# Patient Record
Sex: FEMALE | Race: WHITE | NOT HISPANIC OR LATINO
[De-identification: names, ages, dates, MRNs, and addresses within clinical notes are randomized per-mention and may not be internally consistent; named-entity substitution may affect disease eponyms.]

---

## 2021-04-27 ENCOUNTER — TRANSCRIPTION ENCOUNTER (OUTPATIENT)
Age: 36
End: 2021-04-27

## 2021-12-14 ENCOUNTER — TRANSCRIPTION ENCOUNTER (OUTPATIENT)
Age: 36
End: 2021-12-14

## 2023-02-06 ENCOUNTER — NON-APPOINTMENT (OUTPATIENT)
Age: 38
End: 2023-02-06

## 2023-02-13 ENCOUNTER — NON-APPOINTMENT (OUTPATIENT)
Age: 38
End: 2023-02-13

## 2023-07-15 ENCOUNTER — NON-APPOINTMENT (OUTPATIENT)
Age: 38
End: 2023-07-15

## 2024-03-07 ENCOUNTER — INPATIENT (INPATIENT)
Facility: HOSPITAL | Age: 39
LOS: 2 days | Discharge: HOME | End: 2024-03-10
Attending: OBSTETRICS & GYNECOLOGY | Admitting: OBSTETRICS & GYNECOLOGY
Payer: COMMERCIAL

## 2024-03-07 VITALS — DIASTOLIC BLOOD PRESSURE: 107 MMHG | OXYGEN SATURATION: 99 % | SYSTOLIC BLOOD PRESSURE: 181 MMHG | HEART RATE: 106 BPM

## 2024-03-07 DIAGNOSIS — Z98.890 OTHER SPECIFIED POSTPROCEDURAL STATES: Chronic | ICD-10-CM

## 2024-03-07 DIAGNOSIS — Z3A.39 39 WEEKS GESTATION OF PREGNANCY: ICD-10-CM

## 2024-03-07 DIAGNOSIS — O26.899 OTHER SPECIFIED PREGNANCY RELATED CONDITIONS, UNSPECIFIED TRIMESTER: ICD-10-CM

## 2024-03-07 DIAGNOSIS — Z34.80 ENCOUNTER FOR SUPERVISION OF OTHER NORMAL PREGNANCY, UNSPECIFIED TRIMESTER: ICD-10-CM

## 2024-03-07 LAB
ALBUMIN SERPL ELPH-MCNC: 3.6 G/DL — SIGNIFICANT CHANGE UP (ref 3.3–5)
ALP SERPL-CCNC: 123 U/L — HIGH (ref 40–120)
ALT FLD-CCNC: 22 U/L — SIGNIFICANT CHANGE UP (ref 10–45)
ANION GAP SERPL CALC-SCNC: 17 MMOL/L — SIGNIFICANT CHANGE UP (ref 5–17)
APPEARANCE UR: CLEAR — SIGNIFICANT CHANGE UP
APTT BLD: 28.8 SEC — SIGNIFICANT CHANGE UP (ref 24.5–35.6)
AST SERPL-CCNC: 26 U/L — SIGNIFICANT CHANGE UP (ref 10–40)
BACTERIA # UR AUTO: NEGATIVE /HPF — SIGNIFICANT CHANGE UP
BASOPHILS # BLD AUTO: 0.04 K/UL — SIGNIFICANT CHANGE UP (ref 0–0.2)
BASOPHILS NFR BLD AUTO: 0.3 % — SIGNIFICANT CHANGE UP (ref 0–2)
BILIRUB SERPL-MCNC: 0.3 MG/DL — SIGNIFICANT CHANGE UP (ref 0.2–1.2)
BILIRUB UR-MCNC: NEGATIVE — SIGNIFICANT CHANGE UP
BLD GP AB SCN SERPL QL: POSITIVE — SIGNIFICANT CHANGE UP
BUN SERPL-MCNC: 13 MG/DL — SIGNIFICANT CHANGE UP (ref 7–23)
CALCIUM SERPL-MCNC: 9 MG/DL — SIGNIFICANT CHANGE UP (ref 8.4–10.5)
CAST: 0 /LPF — SIGNIFICANT CHANGE UP (ref 0–4)
CHLORIDE SERPL-SCNC: 103 MMOL/L — SIGNIFICANT CHANGE UP (ref 96–108)
CO2 SERPL-SCNC: 16 MMOL/L — LOW (ref 22–31)
COLOR SPEC: YELLOW — SIGNIFICANT CHANGE UP
CREAT ?TM UR-MCNC: 60 MG/DL — SIGNIFICANT CHANGE UP
CREAT SERPL-MCNC: 0.6 MG/DL — SIGNIFICANT CHANGE UP (ref 0.5–1.3)
DIFF PNL FLD: ABNORMAL
EGFR: 118 ML/MIN/1.73M2 — SIGNIFICANT CHANGE UP
EOSINOPHIL # BLD AUTO: 0.02 K/UL — SIGNIFICANT CHANGE UP (ref 0–0.5)
EOSINOPHIL NFR BLD AUTO: 0.2 % — SIGNIFICANT CHANGE UP (ref 0–6)
FIBRINOGEN PPP-MCNC: 442 MG/DL — SIGNIFICANT CHANGE UP (ref 200–445)
GLUCOSE SERPL-MCNC: 95 MG/DL — SIGNIFICANT CHANGE UP (ref 70–99)
GLUCOSE UR QL: NEGATIVE MG/DL — SIGNIFICANT CHANGE UP
HCT VFR BLD CALC: 37.1 % — SIGNIFICANT CHANGE UP (ref 34.5–45)
HGB BLD-MCNC: 13.3 G/DL — SIGNIFICANT CHANGE UP (ref 11.5–15.5)
IMM GRANULOCYTES NFR BLD AUTO: 0.3 % — SIGNIFICANT CHANGE UP (ref 0–0.9)
INR BLD: 0.9 RATIO — SIGNIFICANT CHANGE UP (ref 0.85–1.18)
KETONES UR-MCNC: ABNORMAL MG/DL
KLEIHAUER-BETKE CALCULATION: 0 % — SIGNIFICANT CHANGE UP (ref 0–0.2)
LDH SERPL L TO P-CCNC: 200 U/L — SIGNIFICANT CHANGE UP (ref 50–242)
LEUKOCYTE ESTERASE UR-ACNC: NEGATIVE — SIGNIFICANT CHANGE UP
LYMPHOCYTES # BLD AUTO: 1.35 K/UL — SIGNIFICANT CHANGE UP (ref 1–3.3)
LYMPHOCYTES # BLD AUTO: 10.9 % — LOW (ref 13–44)
MAGNESIUM SERPL-MCNC: 4.9 MG/DL — HIGH (ref 1.6–2.6)
MAGNESIUM SERPL-MCNC: 5.5 MG/DL — HIGH (ref 1.6–2.6)
MAGNESIUM SERPL-MCNC: <5.7 MG/DL — HIGH (ref 1.6–2.6)
MCHC RBC-ENTMCNC: 32.6 PG — SIGNIFICANT CHANGE UP (ref 27–34)
MCHC RBC-ENTMCNC: 35.8 GM/DL — SIGNIFICANT CHANGE UP (ref 32–36)
MCV RBC AUTO: 90.9 FL — SIGNIFICANT CHANGE UP (ref 80–100)
MONOCYTES # BLD AUTO: 0.61 K/UL — SIGNIFICANT CHANGE UP (ref 0–0.9)
MONOCYTES NFR BLD AUTO: 4.9 % — SIGNIFICANT CHANGE UP (ref 2–14)
NEUTROPHILS # BLD AUTO: 10.32 K/UL — HIGH (ref 1.8–7.4)
NEUTROPHILS NFR BLD AUTO: 83.4 % — HIGH (ref 43–77)
NITRITE UR-MCNC: NEGATIVE — SIGNIFICANT CHANGE UP
NRBC # BLD: 0 /100 WBCS — SIGNIFICANT CHANGE UP (ref 0–0)
PH UR: 7.5 — SIGNIFICANT CHANGE UP (ref 5–8)
PLATELET # BLD AUTO: 121 K/UL — LOW (ref 150–400)
POTASSIUM SERPL-MCNC: 3.7 MMOL/L — SIGNIFICANT CHANGE UP (ref 3.5–5.3)
POTASSIUM SERPL-SCNC: 3.7 MMOL/L — SIGNIFICANT CHANGE UP (ref 3.5–5.3)
PROT ?TM UR-MCNC: 79 MG/DL — HIGH (ref 0–12)
PROT SERPL-MCNC: 7.4 G/DL — SIGNIFICANT CHANGE UP (ref 6–8.3)
PROT UR-MCNC: 100 MG/DL
PROT/CREAT UR-RTO: 1.3 RATIO — HIGH (ref 0–0.2)
PROTHROM AB SERPL-ACNC: 9.5 SEC — SIGNIFICANT CHANGE UP (ref 9.5–13)
RBC # BLD: 4.08 M/UL — SIGNIFICANT CHANGE UP (ref 3.8–5.2)
RBC # FLD: 13.8 % — SIGNIFICANT CHANGE UP (ref 10.3–14.5)
RBC CASTS # UR COMP ASSIST: 4 /HPF — SIGNIFICANT CHANGE UP (ref 0–4)
RH IG SCN BLD-IMP: NEGATIVE — SIGNIFICANT CHANGE UP
SODIUM SERPL-SCNC: 136 MMOL/L — SIGNIFICANT CHANGE UP (ref 135–145)
SP GR SPEC: 1.02 — SIGNIFICANT CHANGE UP (ref 1–1.03)
SQUAMOUS # UR AUTO: 0 /HPF — SIGNIFICANT CHANGE UP (ref 0–5)
T PALLIDUM AB TITR SER: NEGATIVE — SIGNIFICANT CHANGE UP
URATE SERPL-MCNC: 4.6 MG/DL — SIGNIFICANT CHANGE UP (ref 2.5–7)
UROBILINOGEN FLD QL: 1 MG/DL — SIGNIFICANT CHANGE UP (ref 0.2–1)
WBC # BLD: 12.38 K/UL — HIGH (ref 3.8–10.5)
WBC # FLD AUTO: 12.38 K/UL — HIGH (ref 3.8–10.5)
WBC UR QL: 4 /HPF — SIGNIFICANT CHANGE UP (ref 0–5)

## 2024-03-07 PROCEDURE — 86077 PHYS BLOOD BANK SERV XMATCH: CPT

## 2024-03-07 RX ORDER — BENZOCAINE 10 %
1 GEL (GRAM) MUCOUS MEMBRANE EVERY 6 HOURS
Refills: 0 | Status: DISCONTINUED | OUTPATIENT
Start: 2024-03-07 | End: 2024-03-10

## 2024-03-07 RX ORDER — AER TRAVELER 0.5 G/1
1 SOLUTION RECTAL; TOPICAL EVERY 4 HOURS
Refills: 0 | Status: DISCONTINUED | OUTPATIENT
Start: 2024-03-07 | End: 2024-03-10

## 2024-03-07 RX ORDER — DIBUCAINE 1 %
1 OINTMENT (GRAM) RECTAL EVERY 6 HOURS
Refills: 0 | Status: DISCONTINUED | OUTPATIENT
Start: 2024-03-07 | End: 2024-03-10

## 2024-03-07 RX ORDER — HYDROCORTISONE 1 %
1 OINTMENT (GRAM) TOPICAL EVERY 6 HOURS
Refills: 0 | Status: DISCONTINUED | OUTPATIENT
Start: 2024-03-07 | End: 2024-03-10

## 2024-03-07 RX ORDER — TETANUS TOXOID, REDUCED DIPHTHERIA TOXOID AND ACELLULAR PERTUSSIS VACCINE, ADSORBED 5; 2.5; 8; 8; 2.5 [IU]/.5ML; [IU]/.5ML; UG/.5ML; UG/.5ML; UG/.5ML
0.5 SUSPENSION INTRAMUSCULAR ONCE
Refills: 0 | Status: DISCONTINUED | OUTPATIENT
Start: 2024-03-07 | End: 2024-03-10

## 2024-03-07 RX ORDER — PRAMOXINE HYDROCHLORIDE 150 MG/15G
1 AEROSOL, FOAM RECTAL EVERY 4 HOURS
Refills: 0 | Status: DISCONTINUED | OUTPATIENT
Start: 2024-03-07 | End: 2024-03-10

## 2024-03-07 RX ORDER — ACETAMINOPHEN 500 MG
975 TABLET ORAL
Refills: 0 | Status: DISCONTINUED | OUTPATIENT
Start: 2024-03-07 | End: 2024-03-10

## 2024-03-07 RX ORDER — INFLUENZA VIRUS VACCINE 15; 15; 15; 15 UG/.5ML; UG/.5ML; UG/.5ML; UG/.5ML
0.5 SUSPENSION INTRAMUSCULAR ONCE
Refills: 0 | Status: COMPLETED | OUTPATIENT
Start: 2024-03-07 | End: 2024-03-07

## 2024-03-07 RX ORDER — SODIUM CHLORIDE 9 MG/ML
3 INJECTION INTRAMUSCULAR; INTRAVENOUS; SUBCUTANEOUS EVERY 8 HOURS
Refills: 0 | Status: DISCONTINUED | OUTPATIENT
Start: 2024-03-07 | End: 2024-03-09

## 2024-03-07 RX ORDER — MAGNESIUM HYDROXIDE 400 MG/1
30 TABLET, CHEWABLE ORAL
Refills: 0 | Status: DISCONTINUED | OUTPATIENT
Start: 2024-03-07 | End: 2024-03-10

## 2024-03-07 RX ORDER — MAGNESIUM SULFATE 500 MG/ML
4 VIAL (ML) INJECTION ONCE
Refills: 0 | Status: COMPLETED | OUTPATIENT
Start: 2024-03-07 | End: 2024-03-07

## 2024-03-07 RX ORDER — LABETALOL HCL 100 MG
20 TABLET ORAL ONCE
Refills: 0 | Status: COMPLETED | OUTPATIENT
Start: 2024-03-07 | End: 2024-03-07

## 2024-03-07 RX ORDER — LABETALOL HCL 100 MG
40 TABLET ORAL ONCE
Refills: 0 | Status: COMPLETED | OUTPATIENT
Start: 2024-03-07 | End: 2024-03-07

## 2024-03-07 RX ORDER — CITRIC ACID/SODIUM CITRATE 300-500 MG
15 SOLUTION, ORAL ORAL EVERY 6 HOURS
Refills: 0 | Status: DISCONTINUED | OUTPATIENT
Start: 2024-03-07 | End: 2024-03-07

## 2024-03-07 RX ORDER — NIFEDIPINE 30 MG
30 TABLET, EXTENDED RELEASE 24 HR ORAL DAILY
Refills: 0 | Status: DISCONTINUED | OUTPATIENT
Start: 2024-03-07 | End: 2024-03-07

## 2024-03-07 RX ORDER — MAGNESIUM SULFATE 500 MG/ML
2 VIAL (ML) INJECTION
Qty: 40 | Refills: 0 | Status: DISCONTINUED | OUTPATIENT
Start: 2024-03-07 | End: 2024-03-07

## 2024-03-07 RX ORDER — LANOLIN
1 OINTMENT (GRAM) TOPICAL EVERY 6 HOURS
Refills: 0 | Status: DISCONTINUED | OUTPATIENT
Start: 2024-03-07 | End: 2024-03-10

## 2024-03-07 RX ORDER — SIMETHICONE 80 MG/1
80 TABLET, CHEWABLE ORAL EVERY 4 HOURS
Refills: 0 | Status: DISCONTINUED | OUTPATIENT
Start: 2024-03-07 | End: 2024-03-10

## 2024-03-07 RX ORDER — SODIUM CHLORIDE 9 MG/ML
1000 INJECTION, SOLUTION INTRAVENOUS
Refills: 0 | Status: DISCONTINUED | OUTPATIENT
Start: 2024-03-07 | End: 2024-03-07

## 2024-03-07 RX ORDER — OXYCODONE HYDROCHLORIDE 5 MG/1
5 TABLET ORAL
Refills: 0 | Status: DISCONTINUED | OUTPATIENT
Start: 2024-03-07 | End: 2024-03-10

## 2024-03-07 RX ORDER — NIFEDIPINE 30 MG
60 TABLET, EXTENDED RELEASE 24 HR ORAL DAILY
Refills: 0 | Status: DISCONTINUED | OUTPATIENT
Start: 2024-03-08 | End: 2024-03-10

## 2024-03-07 RX ORDER — OXYTOCIN 10 UNIT/ML
333.33 VIAL (ML) INJECTION
Qty: 20 | Refills: 0 | Status: DISCONTINUED | OUTPATIENT
Start: 2024-03-07 | End: 2024-03-10

## 2024-03-07 RX ORDER — OXYCODONE HYDROCHLORIDE 5 MG/1
5 TABLET ORAL ONCE
Refills: 0 | Status: DISCONTINUED | OUTPATIENT
Start: 2024-03-07 | End: 2024-03-10

## 2024-03-07 RX ORDER — OXYTOCIN 10 UNIT/ML
41.67 VIAL (ML) INJECTION
Qty: 20 | Refills: 0 | Status: DISCONTINUED | OUTPATIENT
Start: 2024-03-07 | End: 2024-03-10

## 2024-03-07 RX ORDER — NIFEDIPINE 30 MG
30 TABLET, EXTENDED RELEASE 24 HR ORAL ONCE
Refills: 0 | Status: COMPLETED | OUTPATIENT
Start: 2024-03-07 | End: 2024-03-07

## 2024-03-07 RX ORDER — DIPHENHYDRAMINE HCL 50 MG
25 CAPSULE ORAL EVERY 6 HOURS
Refills: 0 | Status: DISCONTINUED | OUTPATIENT
Start: 2024-03-07 | End: 2024-03-10

## 2024-03-07 RX ORDER — KETOROLAC TROMETHAMINE 30 MG/ML
30 SYRINGE (ML) INJECTION ONCE
Refills: 0 | Status: DISCONTINUED | OUTPATIENT
Start: 2024-03-07 | End: 2024-03-07

## 2024-03-07 RX ORDER — MAGNESIUM SULFATE 500 MG/ML
2 VIAL (ML) INJECTION
Qty: 40 | Refills: 0 | Status: DISCONTINUED | OUTPATIENT
Start: 2024-03-07 | End: 2024-03-10

## 2024-03-07 RX ORDER — IBUPROFEN 200 MG
600 TABLET ORAL EVERY 6 HOURS
Refills: 0 | Status: DISCONTINUED | OUTPATIENT
Start: 2024-03-07 | End: 2024-03-10

## 2024-03-07 RX ORDER — IBUPROFEN 200 MG
600 TABLET ORAL EVERY 6 HOURS
Refills: 0 | Status: COMPLETED | OUTPATIENT
Start: 2024-03-07 | End: 2025-02-03

## 2024-03-07 RX ORDER — CHLORHEXIDINE GLUCONATE 213 G/1000ML
1 SOLUTION TOPICAL DAILY
Refills: 0 | Status: DISCONTINUED | OUTPATIENT
Start: 2024-03-07 | End: 2024-03-07

## 2024-03-07 RX ADMIN — SODIUM CHLORIDE 3 MILLILITER(S): 9 INJECTION INTRAMUSCULAR; INTRAVENOUS; SUBCUTANEOUS at 12:39

## 2024-03-07 RX ADMIN — Medication 975 MILLIGRAM(S): at 23:34

## 2024-03-07 RX ADMIN — Medication 600 MILLIGRAM(S): at 15:05

## 2024-03-07 RX ADMIN — Medication 30 MILLIGRAM(S): at 08:58

## 2024-03-07 RX ADMIN — Medication 600 MILLIGRAM(S): at 14:55

## 2024-03-07 RX ADMIN — Medication 975 MILLIGRAM(S): at 12:08

## 2024-03-07 RX ADMIN — Medication 975 MILLIGRAM(S): at 17:55

## 2024-03-07 RX ADMIN — Medication 1 TABLET(S): at 12:08

## 2024-03-07 RX ADMIN — Medication 20 MILLIGRAM(S): at 12:36

## 2024-03-07 RX ADMIN — Medication 600 MILLIGRAM(S): at 21:05

## 2024-03-07 RX ADMIN — Medication 300 GRAM(S): at 04:40

## 2024-03-07 RX ADMIN — Medication 975 MILLIGRAM(S): at 18:40

## 2024-03-07 RX ADMIN — Medication 40 MILLIGRAM(S): at 12:55

## 2024-03-07 RX ADMIN — Medication 20 MILLIGRAM(S): at 04:34

## 2024-03-07 RX ADMIN — Medication 30 MILLIGRAM(S): at 10:56

## 2024-03-07 RX ADMIN — Medication 30 MILLIGRAM(S): at 13:06

## 2024-03-07 RX ADMIN — Medication 975 MILLIGRAM(S): at 13:00

## 2024-03-07 NOTE — CHART NOTE - NSCHARTNOTEFT_GEN_A_CORE
PA Note  Pt seen for sustained severe range blood pressures. Pt received Labetalol 20mg/40mg IVP. Pt is asymptomatic. Denies headaches, blurry vision, epigastric pain.  T(C): 36.6 (24 @ 10:20), Max: 37.4 (24 @ 05:48)  HR: 73 (24 @ 12:46) (73 - 125)  BP: 165/103 (24 @ 12:46) (103/67 - 188/104)  RR: 18 (24 @ 12:27) (16 - 20)  SpO2: 97% (24 @ 12:12) (85% - 100%)  PPD#0 sp  cb sPEC  cw Mag for seizure prophylaxis  sp Labetalol 20/40 mg IVP  Increase maintenance antihypertensive to Nifedipine 30XL  DW Dr Yadiel Duffy PA-C

## 2024-03-07 NOTE — PROGRESS NOTE ADULT - ASSESSMENT
A: 38y PPD#0 s/p  doing well, now with preeclampsia    Plan:  MgS04 for seizure prophylaxis   procardia increased to 60 mg xl  labetalol ivp x 2  patient overall feeling well  to observe

## 2024-03-07 NOTE — OB PROVIDER LABOR PROGRESS NOTE - NS_SUBJECTIVE/OBJECTIVE_OBGYN_ALL_OB_FT
R2 Labor & Delivery Progress Note     Pt seen & examined at bedside for vaginal exam.     T(C): 36.7 (03-07-24 @ 03:22), Max: 36.7 (03-07-24 @ 02:46)  HR: 107 (03-07-24 @ 04:09) (87 - 125)  BP: 174/101 (03-07-24 @ 04:02) (146/98 - 188/104)  RR: 20 (03-07-24 @ 03:22) (20 - 20)  SpO2: 97% (03-07-24 @ 04:09) (85% - 100%)
P0 39w3d admitted in labor.   has epidural.   rectal pressure.   cat 2 fht, occ variables. overall reassuring.   severe range BP x 2. labetalol 20 IVP improved to 140/90. starting magnesium.

## 2024-03-07 NOTE — OB PROVIDER H&P - NSLOWPPHRISK_OBGYN_A_OB
No previous uterine incision/[place] Pregnancy/Less than or equal to 4 previous vaginal births/No known bleeding disorder

## 2024-03-07 NOTE — OB PROVIDER DELIVERY SUMMARY - NSSELHIDDEN_OBGYN_ALL_OB_FT
[NS_DeliveryAttending1_OBGYN_ALL_OB_FT:[id number]],[NS_DeliveryAssist1_OBGYN_ALL_OB_FT:[id number]]

## 2024-03-07 NOTE — CHART NOTE - NSCHARTNOTEFT_GEN_A_CORE
R4 Chart Note       The above's patients blood pressures from admission to current were reviewed. Patient with multiple elevated blood pressures most in the severe range since admission , however patient with discomfort of labor at this time. Patient then received epidural with with initial minimal relief in pain. Patient re-examined at noted to be fully dialated at this time . Patient pain much improved but with more rectal pressure at this time . Patient to receive top off.     If patient continues to have elevated blood pressures despite adequate pain control , patient will meet criteria for severe preeclampsia and be started on Magnesium , possible need IV blood pressure medication and oral pain medication         d/w    Bernadette Mcbride, PGY4 R4 Chart Note       The above's patients blood pressures from admission to current were reviewed. Patient with multiple elevated blood pressures most in the severe range since admission , however patient with discomfort of labor at this time. Patient then received epidural with with initial minimal relief in pain. Patient re-examined at noted to be fully dialated at this time . Patient pain much improved but with more rectal pressure at this time . Patient to receive top off.     If patient continues to have elevated blood pressures despite adequate pain control , patient will meet criteria for severe preeclampsia and be started on Magnesium , possible need IV blood pressure medication and oral pain medication         d/w  and Dr. Heidi Mcbride, PGY4       Addendum       Patient continued to have elevated blood pressures, meeting criteria for severe preeclampsia. Patient to receive Lab 20 IVP and Magnesium        d/w  and Dr. Heidi Mcbride, PGY4

## 2024-03-07 NOTE — OB RN DELIVERY SUMMARY - NSSELHIDDEN_OBGYN_ALL_OB_FT
[NS_DeliveryAttending1_OBGYN_ALL_OB_FT:[id number]],[NS_DeliveryAssist1_OBGYN_ALL_OB_FT:[id number]],[NS_DeliveryRN_OBGYN_ALL_OB_FT:[id number]=]

## 2024-03-07 NOTE — OB PROVIDER H&P - NS_OBGYNHISTORY_OBGYN_ALL_OB_FT
– PNC: Denies prenatal issues. GBS negative. EFW 3700g extrapolated from sono in office 2 weeks ago.  – OBHx: Primigravida. Denies ectopic pregnancies, terminations, or miscarriages.  – GynHx: Ovarian cystectomy 2012. Fibroids unsure which position, ~3cm per patient. Denies abnormal pap smears, STIs

## 2024-03-07 NOTE — OB PROVIDER H&P - ATTENDING COMMENTS
primip 39w3d in active labor.   admitted for labor. elevated BP labile.   routine orders.   epidural and expt mgmt.   efw 7-8lbs.   disc vaginal delivery, possible operative and possible  if indicated.   accepts blood products.   all questions and concerns addressed.   Nolan SU

## 2024-03-07 NOTE — OB PROVIDER H&P - PROBLEM SELECTOR PLAN 1
Plan  1. Admit to L&D. Routine Labs. IVF.  2. Expectant management  3. Fetus: cat 1 tracing. VTX. 3700g extrapolated from sono in office 2 weeks ago. Continuous EFM. Sono. No concerns.  4. Prenatal issues: none  5. GBS negative  6. Pain: anesthesia consult for an epidural  7. Severe range BP in presence of intense pain with contractions: HELLP labs, monitor serial BPs, consider treatment    Discussed with Dr. Heidi Winters PA-C

## 2024-03-07 NOTE — OB PROVIDER H&P - NSHPPHYSICALEXAM_GEN_ALL_CORE
Objective  – VS  T(C): 36.7 (03-07-24 @ 02:46)  HR: 101 (03-07-24 @ 02:47)  BP: 168/89 (03-07-24 @ 02:46)  RR: 20 (03-07-24 @ 02:46)  SpO2: 99% (03-07-24 @ 02:47)  – PE:   General: visible discomfort along with contractions with regular deep breathing  CV: RRR  Pulm: breathing comfortably on RA  Abd: gravid, nontender  Extr: moving all extremities with ease  – VE: 5/50/-3  – FHT: 135/moderate variability/+accels/-decels  – [place]: q2-4 min  – EFW: 3700g extrapolated from sono in office 2 weeks ago  – Sono: vertex

## 2024-03-07 NOTE — OB PROVIDER LABOR PROGRESS NOTE - ASSESSMENT
primip 39w3d in active labor.   setting up for delivery.   cat 2 fht   hopeful for vaginal delivery.   severe pec now with mag.   disc vaginal delivery, possible operative and possible  if indicated.   accepts blood products.   all questions and concerns addressed.   Nolan SU
Plan: 38y y/o  @ 39w3d. Feeling rectal pressure and now fully dilated  - anticipate   - Continuous EFM, [place]  - Con't IVF  - patient also with severe range BP with pain and contractions; CTM BPs, discussion of possible diagnosis of PEC with SF with patient and   - HELLP labs pending    Porsha Beasley MD  PGY-2

## 2024-03-07 NOTE — OB PROVIDER H&P - HISTORY OF PRESENT ILLNESS
PA Admission H&P    Subjective  HPI: 34F  39.4wks gestational age presents for ROL. Pt notes onset of contractions beginning 2.5hrs ago, occurring every 3 min, intense pain. Pt desires an epidural at this time. At time in triage, pt notes feeling gush of fluid. +FM. -VB. Pt denies headaches, visual disturbances, RUQ pain, or any other concerns. Pt notes she was 2cm dilated in office today.    – PNC: Denies prenatal issues. GBS negative. EFW 3700g extrapolated from sono in office 2 weeks ago.  – OBHx: Primigravida. Denies ectopic pregnancies, terminations, or miscarriages.  – GynHx: Ovarian cystectomy . Fibroids unsure which position, ~3cm per patient. Denies abnormal pap smears, STIs  – PMH: Anxiety (previously on Lexapro 10mg QD before pregnancy, now discontinued)  – PSH: Rhinoplasty. Ovarian cystectomy .   – Psych: Anxiety (previously on Lexapro 10mg QD before pregnancy, now discontinued). Denies depression.  – Social: Denies T/E/D  – Meds: PNV   – Allergies: NKDA  – Will accept blood transfusions? Yes

## 2024-03-07 NOTE — OB RN DELIVERY SUMMARY - NS_SEPSISRSKCALC_OBGYN_ALL_OB_FT
EOS calculated successfully. EOS Risk Factor: 0.5/1000 live births ([facility] national incidence); GA=39w3d; Temp=99.3; ROM=2.867; GBS='Negative'; Antibiotics='No antibiotics or any antibiotics < 2 hrs prior to birth'

## 2024-03-07 NOTE — OB PROVIDER DELIVERY SUMMARY - NSPROVIDERDELIVERYNOTE_OBGYN_ALL_OB_FT
Spontaneous vaginal delivery of liveborn female infant from ALEXEY position. Head, shoulders, and body delivered easily. Infant was suctioned. No mec. 1 minute delayed cord clamping was performed. Cord clamped and cut and infant passed to mother. Placenta delivered intact with a 3 vessel cord. Fundal massage was given and uterine fundus was found to be firm. Vaginal exam revealed an intact cervix, vaginal walls, and sulci. Patient had a 1st degree laceration in the perineum that was repaired with 2.0 vicryl-rapide suture. Excellent hemostasis was noted. Patient was stable. Count was correct x2.    Apgars 9/9  EBL: 400

## 2024-03-08 LAB
ALBUMIN SERPL ELPH-MCNC: 3.2 G/DL — LOW (ref 3.3–5)
ALP SERPL-CCNC: 98 U/L — SIGNIFICANT CHANGE UP (ref 40–120)
ALT FLD-CCNC: 24 U/L — SIGNIFICANT CHANGE UP (ref 10–45)
ANION GAP SERPL CALC-SCNC: 11 MMOL/L — SIGNIFICANT CHANGE UP (ref 5–17)
APTT BLD: 27.3 SEC — SIGNIFICANT CHANGE UP (ref 24.5–35.6)
AST SERPL-CCNC: 32 U/L — SIGNIFICANT CHANGE UP (ref 10–40)
BASOPHILS # BLD AUTO: 0.03 K/UL — SIGNIFICANT CHANGE UP (ref 0–0.2)
BASOPHILS NFR BLD AUTO: 0.3 % — SIGNIFICANT CHANGE UP (ref 0–2)
BILIRUB SERPL-MCNC: 0.2 MG/DL — SIGNIFICANT CHANGE UP (ref 0.2–1.2)
BUN SERPL-MCNC: 6 MG/DL — LOW (ref 7–23)
CALCIUM SERPL-MCNC: 6.6 MG/DL — LOW (ref 8.4–10.5)
CHLORIDE SERPL-SCNC: 104 MMOL/L — SIGNIFICANT CHANGE UP (ref 96–108)
CO2 SERPL-SCNC: 22 MMOL/L — SIGNIFICANT CHANGE UP (ref 22–31)
CREAT SERPL-MCNC: 0.53 MG/DL — SIGNIFICANT CHANGE UP (ref 0.5–1.3)
EGFR: 122 ML/MIN/1.73M2 — SIGNIFICANT CHANGE UP
EOSINOPHIL # BLD AUTO: 0.03 K/UL — SIGNIFICANT CHANGE UP (ref 0–0.5)
EOSINOPHIL NFR BLD AUTO: 0.3 % — SIGNIFICANT CHANGE UP (ref 0–6)
FIBRINOGEN PPP-MCNC: 394 MG/DL — SIGNIFICANT CHANGE UP (ref 200–445)
GLUCOSE SERPL-MCNC: 80 MG/DL — SIGNIFICANT CHANGE UP (ref 70–99)
HCT VFR BLD CALC: 31.5 % — LOW (ref 34.5–45)
HGB BLD-MCNC: 10.7 G/DL — LOW (ref 11.5–15.5)
IMM GRANULOCYTES NFR BLD AUTO: 0.5 % — SIGNIFICANT CHANGE UP (ref 0–0.9)
INR BLD: 0.84 RATIO — LOW (ref 0.85–1.18)
LDH SERPL L TO P-CCNC: 275 U/L — HIGH (ref 50–242)
LYMPHOCYTES # BLD AUTO: 1.62 K/UL — SIGNIFICANT CHANGE UP (ref 1–3.3)
LYMPHOCYTES # BLD AUTO: 15.9 % — SIGNIFICANT CHANGE UP (ref 13–44)
MCHC RBC-ENTMCNC: 32.2 PG — SIGNIFICANT CHANGE UP (ref 27–34)
MCHC RBC-ENTMCNC: 34 GM/DL — SIGNIFICANT CHANGE UP (ref 32–36)
MCV RBC AUTO: 94.9 FL — SIGNIFICANT CHANGE UP (ref 80–100)
MONOCYTES # BLD AUTO: 0.48 K/UL — SIGNIFICANT CHANGE UP (ref 0–0.9)
MONOCYTES NFR BLD AUTO: 4.7 % — SIGNIFICANT CHANGE UP (ref 2–14)
NEUTROPHILS # BLD AUTO: 7.97 K/UL — HIGH (ref 1.8–7.4)
NEUTROPHILS NFR BLD AUTO: 78.3 % — HIGH (ref 43–77)
NRBC # BLD: 0 /100 WBCS — SIGNIFICANT CHANGE UP (ref 0–0)
PLATELET # BLD AUTO: 105 K/UL — LOW (ref 150–400)
POTASSIUM SERPL-MCNC: 3.8 MMOL/L — SIGNIFICANT CHANGE UP (ref 3.5–5.3)
POTASSIUM SERPL-SCNC: 3.8 MMOL/L — SIGNIFICANT CHANGE UP (ref 3.5–5.3)
PROT SERPL-MCNC: 6.2 G/DL — SIGNIFICANT CHANGE UP (ref 6–8.3)
PROTHROM AB SERPL-ACNC: 8.9 SEC — LOW (ref 9.5–13)
RBC # BLD: 3.32 M/UL — LOW (ref 3.8–5.2)
RBC # FLD: 14.2 % — SIGNIFICANT CHANGE UP (ref 10.3–14.5)
SODIUM SERPL-SCNC: 137 MMOL/L — SIGNIFICANT CHANGE UP (ref 135–145)
URATE SERPL-MCNC: 3.8 MG/DL — SIGNIFICANT CHANGE UP (ref 2.5–7)
WBC # BLD: 10.18 K/UL — SIGNIFICANT CHANGE UP (ref 3.8–10.5)
WBC # FLD AUTO: 10.18 K/UL — SIGNIFICANT CHANGE UP (ref 3.8–10.5)

## 2024-03-08 RX ORDER — LABETALOL HCL 100 MG
200 TABLET ORAL
Refills: 0 | Status: DISCONTINUED | OUTPATIENT
Start: 2024-03-08 | End: 2024-03-09

## 2024-03-08 RX ORDER — SODIUM CHLORIDE 0.65 %
1 AEROSOL, SPRAY (ML) NASAL ONCE
Refills: 0 | Status: COMPLETED | OUTPATIENT
Start: 2024-03-08 | End: 2024-03-08

## 2024-03-08 RX ADMIN — Medication 975 MILLIGRAM(S): at 11:54

## 2024-03-08 RX ADMIN — Medication 600 MILLIGRAM(S): at 09:48

## 2024-03-08 RX ADMIN — Medication 600 MILLIGRAM(S): at 02:58

## 2024-03-08 RX ADMIN — Medication 200 MILLIGRAM(S): at 14:27

## 2024-03-08 RX ADMIN — Medication 1 SPRAY(S): at 10:26

## 2024-03-08 RX ADMIN — Medication 60 MILLIGRAM(S): at 11:11

## 2024-03-08 RX ADMIN — Medication 975 MILLIGRAM(S): at 11:11

## 2024-03-08 RX ADMIN — Medication 600 MILLIGRAM(S): at 15:36

## 2024-03-08 RX ADMIN — Medication 975 MILLIGRAM(S): at 18:40

## 2024-03-08 RX ADMIN — SODIUM CHLORIDE 3 MILLILITER(S): 9 INJECTION INTRAMUSCULAR; INTRAVENOUS; SUBCUTANEOUS at 14:19

## 2024-03-08 RX ADMIN — Medication 600 MILLIGRAM(S): at 14:26

## 2024-03-08 RX ADMIN — Medication 600 MILLIGRAM(S): at 08:51

## 2024-03-08 RX ADMIN — Medication 600 MILLIGRAM(S): at 21:37

## 2024-03-08 RX ADMIN — Medication 1 TABLET(S): at 11:10

## 2024-03-08 RX ADMIN — Medication 975 MILLIGRAM(S): at 05:24

## 2024-03-08 RX ADMIN — Medication 600 MILLIGRAM(S): at 20:28

## 2024-03-08 RX ADMIN — Medication 975 MILLIGRAM(S): at 17:33

## 2024-03-08 NOTE — PROVIDER CONTACT NOTE (OTHER) - BACKGROUND
day 1 s/p magnesium sulfate on procardia 60XL daily
 day 0 Mg sPEC  Procardia 30xl daily started  today

## 2024-03-08 NOTE — PROVIDER CONTACT NOTE (OTHER) - ASSESSMENT
Patient is asymptomatic denies headache, visual changes, RUQ pain
Pt denies any headache, dizziness, and blurry vision

## 2024-03-08 NOTE — PROGRESS NOTE ADULT - ASSESSMENT
A/P: 37yo PPD#1 s/p  complicated by SPEC. Patient is stable and doing well post-partum.     #SPEC   - c/w Magnesium for 24 hours pp   - s/p Lab 20, 20, 40   - c/w Procardia 60xl   - Monitor for resolution of the HA after discontinaution of magnesium.   - f/u AM HELLP     #PP care   - Pain well controlled, continue current pain regimen  - Increase ambulation, SCDs when not ambulating  - Continue regular diet    Jane Ledesma MD  PGY-3  A/P: 39yo PPD#1 s/p  complicated by SPEC. Patient is stable and doing well post-partum.     #SPEC   - c/w Magnesium for 24 hours pp   - s/p Lab 20, 20, 40   - c/w Procardia 60xl   - Monitor for resolution of the HA after discontinuation of magnesium.   - f/u AM HELLP     #PP care   - Pain well controlled, continue current pain regimen  - Increase ambulation, SCDs when not ambulating  - Continue regular diet    Jane Ledesma MD  PGY-3

## 2024-03-08 NOTE — PROVIDER CONTACT NOTE (OTHER) - ACTION/TREATMENT ORDERED:
Pt started on labetalol 200mg PO BID given at 1420. Will continue to monitor BP
will repeat in 15 minutes

## 2024-03-09 RX ORDER — LABETALOL HCL 100 MG
200 TABLET ORAL THREE TIMES A DAY
Refills: 0 | Status: DISCONTINUED | OUTPATIENT
Start: 2024-03-09 | End: 2024-03-10

## 2024-03-09 RX ADMIN — Medication 600 MILLIGRAM(S): at 04:19

## 2024-03-09 RX ADMIN — Medication 60 MILLIGRAM(S): at 12:10

## 2024-03-09 RX ADMIN — Medication 975 MILLIGRAM(S): at 23:47

## 2024-03-09 RX ADMIN — Medication 975 MILLIGRAM(S): at 18:32

## 2024-03-09 RX ADMIN — Medication 975 MILLIGRAM(S): at 01:24

## 2024-03-09 RX ADMIN — Medication 600 MILLIGRAM(S): at 21:12

## 2024-03-09 RX ADMIN — Medication 600 MILLIGRAM(S): at 03:43

## 2024-03-09 RX ADMIN — Medication 1 TABLET(S): at 12:10

## 2024-03-09 RX ADMIN — Medication 975 MILLIGRAM(S): at 13:23

## 2024-03-09 RX ADMIN — Medication 600 MILLIGRAM(S): at 09:22

## 2024-03-09 RX ADMIN — Medication 975 MILLIGRAM(S): at 00:04

## 2024-03-09 RX ADMIN — Medication 600 MILLIGRAM(S): at 10:20

## 2024-03-09 RX ADMIN — Medication 975 MILLIGRAM(S): at 06:22

## 2024-03-09 RX ADMIN — Medication 200 MILLIGRAM(S): at 12:10

## 2024-03-09 RX ADMIN — Medication 200 MILLIGRAM(S): at 21:12

## 2024-03-09 RX ADMIN — Medication 975 MILLIGRAM(S): at 12:11

## 2024-03-09 RX ADMIN — Medication 600 MILLIGRAM(S): at 22:00

## 2024-03-09 RX ADMIN — Medication 200 MILLIGRAM(S): at 03:43

## 2024-03-09 RX ADMIN — Medication 600 MILLIGRAM(S): at 16:15

## 2024-03-09 RX ADMIN — Medication 975 MILLIGRAM(S): at 05:12

## 2024-03-09 RX ADMIN — Medication 975 MILLIGRAM(S): at 17:13

## 2024-03-09 RX ADMIN — Medication 600 MILLIGRAM(S): at 15:14

## 2024-03-09 NOTE — PROGRESS NOTE ADULT - ASSESSMENT
A/P: 39yo PPD#2 s/p  complicated by SPEC. Patient is stable and doing well post-partum.     #SPEC   - BP monitoring  - c/w Magnesium for 24 hours pp   - s/p Lab 20, 20, 40   - c/w Procardia 60xl and Labetalol 200mg BID    #PP care   - Pain well controlled, continue current pain regimen  - Increase ambulation, SCDs when not ambulating  - Continue regular diet    Jane Ledesma MD  PGY-3

## 2024-03-10 ENCOUNTER — TRANSCRIPTION ENCOUNTER (OUTPATIENT)
Age: 39
End: 2024-03-10

## 2024-03-10 VITALS
SYSTOLIC BLOOD PRESSURE: 128 MMHG | OXYGEN SATURATION: 97 % | HEART RATE: 71 BPM | TEMPERATURE: 98 F | RESPIRATION RATE: 18 BRPM | DIASTOLIC BLOOD PRESSURE: 84 MMHG

## 2024-03-10 PROCEDURE — 36415 COLL VENOUS BLD VENIPUNCTURE: CPT

## 2024-03-10 PROCEDURE — 86870 RBC ANTIBODY IDENTIFICATION: CPT

## 2024-03-10 PROCEDURE — 82570 ASSAY OF URINE CREATININE: CPT

## 2024-03-10 PROCEDURE — 81001 URINALYSIS AUTO W/SCOPE: CPT

## 2024-03-10 PROCEDURE — 83615 LACTATE (LD) (LDH) ENZYME: CPT

## 2024-03-10 PROCEDURE — 59050 FETAL MONITOR W/REPORT: CPT

## 2024-03-10 PROCEDURE — 84156 ASSAY OF PROTEIN URINE: CPT

## 2024-03-10 PROCEDURE — 86901 BLOOD TYPING SEROLOGIC RH(D): CPT

## 2024-03-10 PROCEDURE — 85610 PROTHROMBIN TIME: CPT

## 2024-03-10 PROCEDURE — 85384 FIBRINOGEN ACTIVITY: CPT

## 2024-03-10 PROCEDURE — 85460 HEMOGLOBIN FETAL: CPT

## 2024-03-10 PROCEDURE — 86850 RBC ANTIBODY SCREEN: CPT

## 2024-03-10 PROCEDURE — 86900 BLOOD TYPING SEROLOGIC ABO: CPT

## 2024-03-10 PROCEDURE — 85730 THROMBOPLASTIN TIME PARTIAL: CPT

## 2024-03-10 PROCEDURE — 86780 TREPONEMA PALLIDUM: CPT

## 2024-03-10 PROCEDURE — 85025 COMPLETE CBC W/AUTO DIFF WBC: CPT

## 2024-03-10 PROCEDURE — 83735 ASSAY OF MAGNESIUM: CPT

## 2024-03-10 PROCEDURE — 84550 ASSAY OF BLOOD/URIC ACID: CPT

## 2024-03-10 PROCEDURE — 80053 COMPREHEN METABOLIC PANEL: CPT

## 2024-03-10 RX ORDER — ACETAMINOPHEN 500 MG
3 TABLET ORAL
Qty: 0 | Refills: 0 | DISCHARGE
Start: 2024-03-10

## 2024-03-10 RX ORDER — NIFEDIPINE 30 MG
1 TABLET, EXTENDED RELEASE 24 HR ORAL
Qty: 0 | Refills: 0 | DISCHARGE
Start: 2024-03-10

## 2024-03-10 RX ORDER — IBUPROFEN 200 MG
1 TABLET ORAL
Qty: 0 | Refills: 0 | DISCHARGE
Start: 2024-03-10

## 2024-03-10 RX ORDER — LABETALOL HCL 100 MG
1 TABLET ORAL
Refills: 0
Start: 2024-03-10

## 2024-03-10 RX ORDER — LABETALOL HCL 100 MG
1 TABLET ORAL
Qty: 20 | Refills: 0
Start: 2024-03-10

## 2024-03-10 RX ADMIN — Medication 975 MILLIGRAM(S): at 00:45

## 2024-03-10 RX ADMIN — Medication 975 MILLIGRAM(S): at 06:00

## 2024-03-10 RX ADMIN — Medication 600 MILLIGRAM(S): at 09:44

## 2024-03-10 RX ADMIN — Medication 60 MILLIGRAM(S): at 12:14

## 2024-03-10 RX ADMIN — Medication 1 TABLET(S): at 12:14

## 2024-03-10 RX ADMIN — Medication 600 MILLIGRAM(S): at 03:59

## 2024-03-10 RX ADMIN — Medication 600 MILLIGRAM(S): at 04:48

## 2024-03-10 RX ADMIN — Medication 975 MILLIGRAM(S): at 06:58

## 2024-03-10 RX ADMIN — Medication 600 MILLIGRAM(S): at 08:43

## 2024-03-10 RX ADMIN — Medication 200 MILLIGRAM(S): at 05:59

## 2024-03-10 RX ADMIN — Medication 975 MILLIGRAM(S): at 13:06

## 2024-03-10 RX ADMIN — Medication 975 MILLIGRAM(S): at 12:14

## 2024-03-10 NOTE — PROGRESS NOTE ADULT - NS ATTEND AMEND GEN_ALL_CORE FT
doing well. min pain on po pain meds.   BP better, no pec sx.   ppd3 doing well  BP well managed on procardia xl 60mg po daily, labetalol 200mg po tid  dc home today  fu in office in 3-4days  allan campos md
doing well. min pain with po pain meds.   BP mostly ok on current regimen but a few higher readings a few hours before next dosage of labetalol  currently on labetalol 200mg po bid and procardia xl 60mg po daily  ppd2 doing well but will increase labetalol dosage to TID  pt aware  likely go home tomorrow  allan campos md
doing well. min lochia. mood ok.  no PEC sx, feeling much better after mag stopped.   /70s while on Mag  but since Mag off 130-150/80-90s  ppd1 doing well  on procardia xl 60mg po daily  will add labetalol 200mg po bid  routine postpartum care  allan campos md

## 2024-03-10 NOTE — DISCHARGE NOTE OB - IF YOU ARE A SMOKER, IT IS IMPORTANT FOR YOUR HEALTH TO STOP SMOKING. PLEASE BE AWARE THAT SECOND HAND SMOKE IS ALSO HARMFUL.
Statement Selected Referred To Mid-Level For Closure Text (Leave Blank If You Do Not Want): After obtaining clear surgical margins the patient was sent to a mid-level provider for surgical repair.  The patient understands they will receive post-surgical care and follow-up from the mid-level provider.

## 2024-03-10 NOTE — DISCHARGE NOTE OB - MEDICATION SUMMARY - MEDICATIONS TO STOP TAKING
I will STOP taking the medications listed below when I get home from the hospital:  None
sudden onset

## 2024-03-10 NOTE — DISCHARGE NOTE OB - INSTRUCTIONS
Follow up with MD as directed. Monitor BP at home call if BP 90/60 or 150/90. Call MD if any headache, dizziness, blurry vision. Call MD if any heavy vaginal bleeding, large clots, fever, chills, burning on urination.

## 2024-03-10 NOTE — DISCHARGE NOTE OB - MEDICATION SUMMARY - MEDICATIONS TO TAKE
I will START or STAY ON the medications listed below when I get home from the hospital:    ibuprofen 600 mg oral tablet  -- 1 tab(s) by mouth every 6 hours  -- Indication: For Vaginal delivery    acetaminophen 325 mg oral tablet  -- 3 tab(s) by mouth every 6 hours  -- Indication: For Vaginal delivery    labetalol 200 mg oral tablet  -- 1 tab(s) by mouth 3 times a day  -- Indication: For Severe preeclampsia    NIFEdipine 60 mg oral tablet, extended release  -- 1 tab(s) by mouth once a day  -- Indication: For Severe preeclampsia

## 2024-03-10 NOTE — PROGRESS NOTE ADULT - ASSESSMENT
A/P: 37yo PPD#3 s/p  complicated by SPEC. Patient is stable and doing well post-partum.     #SPEC   - BP monitoring  - s/p Magnesium for 24 hours pp   - s/p Lab 20, 20, 40   - c/w Procardia 60xl and Labetalol 200mg BID    #PP care   - Pain well controlled, continue current pain regimen  - Increase ambulation, SCDs when not ambulating  - Continue regular diet    Denisse Pacheco, PGY3

## 2024-03-10 NOTE — DISCHARGE NOTE OB - CARE PROVIDER_API CALL
Rosemarie Lujan  Obstetrics and Gynecology  [address] [place] [id number]  Phone: [phone]  Fax: [phone]  Established Patient  Follow Up Time: 1-3 days

## 2024-03-10 NOTE — DISCHARGE NOTE OB - HOSPITAL COURSE
vaginal delivery  severe preeclampsia, BP management  routine postpartum care with BP mgmt  dc home ppd3  fu in office in 3-4days for BP check

## 2024-03-10 NOTE — DISCHARGE NOTE OB - CARE PLAN
1 Principal Discharge DX:	Severe preeclampsia  Assessment and plan of treatment:	BP management  Secondary Diagnosis:	Vaginal delivery  Assessment and plan of treatment:	self ambulation, pain control

## 2024-03-10 NOTE — DISCHARGE NOTE OB - PATIENT PORTAL LINK FT
You can access the FollowMyHealth Patient Portal offered by [facility] by registering at the following website: [email or web address]. By joining [facility]’s FollowMyHealth portal, you will also be able to view your health information using other applications (apps) compatible with our system.

## 2024-03-10 NOTE — PROGRESS NOTE ADULT - SUBJECTIVE AND OBJECTIVE BOX
S: Patient feels well. Pain is well controlled. She is tolerating a regular diet and passing flatus. She is voiding spontaneously, and ambulating without difficulty. Denies CP/SOB. Denies lightheadedness/dizziness. Denies N/V. Denies HA, vision changes, or RUQ pain.     O:  Vitals:   Vital Signs Last 24 Hrs  T(C): 36.8 (09 Mar 2024 00:20), Max: 37 (08 Mar 2024 09:00)  T(F): 98.2 (09 Mar 2024 00:20), Max: 98.6 (08 Mar 2024 09:00)  HR: 92 (09 Mar 2024 00:20) (84 - 93)  BP: 133/87 (09 Mar 2024 00:20) (122/79 - 153/97)  BP(mean): --  RR: 18 (09 Mar 2024 00:20) (18 - 18)  SpO2: 97% (09 Mar 2024 00:20) (97% - 98%)    Parameters below as of 09 Mar 2024 00:20  Patient On (Oxygen Delivery Method): room air        MEDICATIONS  (STANDING):  acetaminophen     Tablet .. 975 milliGRAM(s) Oral <User Schedule>  diphtheria/tetanus/pertussis (acellular) Vaccine (Adacel) 0.5 milliLiter(s) IntraMuscular once  ibuprofen  Tablet. 600 milliGRAM(s) Oral every 6 hours  labetalol 200 milliGRAM(s) Oral two times a day  magnesium sulfate Infusion 2 Gm/Hr (50 mL/Hr) IV Continuous <Continuous>  NIFEdipine XL 60 milliGRAM(s) Oral daily  oxytocin Infusion 41.667 milliUNIT(s)/Min (125 mL/Hr) IV Continuous <Continuous>  oxytocin Infusion 333.333 milliUNIT(s)/Min (1000 mL/Hr) IV Continuous <Continuous>  prenatal multivitamin 1 Tablet(s) Oral daily  sodium chloride 0.9% lock flush 3 milliLiter(s) IV Push every 8 hours    MEDICATIONS  (PRN):  benzocaine 20%/menthol 0.5% Spray 1 Spray(s) Topical every 6 hours PRN for Perineal discomfort  dibucaine 1% Ointment 1 Application(s) Topical every 6 hours PRN Perineal discomfort  diphenhydrAMINE 25 milliGRAM(s) Oral every 6 hours PRN Pruritus  hydrocortisone 1% Cream 1 Application(s) Topical every 6 hours PRN Moderate Pain (4-6)  lanolin Ointment 1 Application(s) Topical every 6 hours PRN nipple soreness  magnesium hydroxide Suspension 30 milliLiter(s) Oral two times a day PRN Constipation  oxyCODONE    IR 5 milliGRAM(s) Oral every 3 hours PRN Moderate to Severe Pain (4-10)  oxyCODONE    IR 5 milliGRAM(s) Oral once PRN Moderate to Severe Pain (4-10)  pramoxine 1%/zinc 5% Cream 1 Application(s) Topical every 4 hours PRN Moderate Pain (4-6)  simethicone 80 milliGRAM(s) Chew every 4 hours PRN Gas  witch hazel Pads 1 Application(s) Topical every 4 hours PRN Perineal discomfort      Labs:  Blood type: A Negative  Rubella IgG: RPR: Negative                          10.7<L>   10.18 >-----------< 105<L>    ( 03-08 @ 06:49 )             31.5<L>                        13.3   12.38<H> >-----------< 121<L>    ( 03-07 @ 03:13 )             37.1    03-08-24 @ 06:49      137  |  104  |  6<L>  ----------------------------<  80  3.8   |  22  |  0.53    03-07-24 @ 03:13      136  |  103  |  13  ----------------------------<  95  3.7   |  16<L>  |  0.60        Ca    6.6<L>      08 Mar 2024 06:49  Ca    9.0      07 Mar 2024 03:13  Mg     5.5<H>     03-07  Mg     <5.7<H>     03-07  Mg     4.9<H>     03-07    TPro  6.2  /  Alb  3.2<L>  /  TBili  0.2  /  DBili  x   /  AST  32  /  ALT  24  /  AlkPhos  98  03-08-24 @ 06:49  TPro  7.4  /  Alb  3.6  /  TBili  0.3  /  DBili  x   /  AST  26  /  ALT  22  /  AlkPhos  123<H>  03-07-24 @ 03:13          Physical Exam:  General: NAD  Abdomen: soft, non-tender, non-distended, fundus firm below the umbillicus   Vaginal: Lochia wnl  Extremities: No erythema/edema. No calf tenderness.     
39yo  PPD#1 s/p  c/b SPEC. Patient feels well. Pain is well controlled. She is tolerating a regular diet. She is voiding spontaneously. Denies CP/SOB. Denies lightheadedness/dizziness. Denies N/V. Feels a mild HA attributed to lack of sleep / magnesium. No visual change. No RUQ pain, visual changes, or lower extremity edema.     O:  Vitals:  Vital Signs Last 24 Hrs  T(C): 36.8 (07 Mar 2024 23:13), Max: 37.4 (07 Mar 2024 05:48)  T(F): 98.3 (07 Mar 2024 23:13), Max: 99.3 (07 Mar 2024 05:48)  HR: 92 (08 Mar 2024 03:01) (73 - 122)  BP: 127/78 (08 Mar 2024 03:01) (96/63 - 177/110)  BP(mean): --  RR: 18 (08 Mar 2024 03:01) (16 - 18)  SpO2: 97% (08 Mar 2024 03:01) (93% - 98%)    Parameters below as of 08 Mar 2024 03:01  Patient On (Oxygen Delivery Method): room air        MEDICATIONS  (STANDING):  acetaminophen     Tablet .. 975 milliGRAM(s) Oral <User Schedule>  diphtheria/tetanus/pertussis (acellular) Vaccine (Adacel) 0.5 milliLiter(s) IntraMuscular once  ibuprofen  Tablet. 600 milliGRAM(s) Oral every 6 hours  magnesium sulfate Infusion 2 Gm/Hr (50 mL/Hr) IV Continuous <Continuous>  NIFEdipine XL 60 milliGRAM(s) Oral daily  oxytocin Infusion 41.667 milliUNIT(s)/Min (125 mL/Hr) IV Continuous <Continuous>  oxytocin Infusion 333.333 milliUNIT(s)/Min (1000 mL/Hr) IV Continuous <Continuous>  prenatal multivitamin 1 Tablet(s) Oral daily  sodium chloride 0.9% lock flush 3 milliLiter(s) IV Push every 8 hours      Labs:  Blood type: A Negative  Rubella IgG: RPR: Negative                          13.3   12.38<H> >-----------< 121<L>    (  @ 03:13 )             37.1    24 @ 03:13      136  |  103  |  13  ----------------------------<  95  3.7   |  16<L>  |  0.60        Ca    9.0      07 Mar 2024 03:13  Mg     5.5<H>     03  Mg     <5.7<H>       Mg     4.9<H>         TPro  7.4  /  Alb  3.6  /  TBili  0.3  /  DBili  x   /  AST  26  /  ALT  22  /  AlkPhos  123<H>  -24 @ 03:13        Physical Exam:  General: NAD  Abdomen: soft, non-tender, non-distended, fundus firm and below the umbillicus   Vaginal: Lochia wnl  Extremities: No erythema/edema. No calf tenderness 
S: Patient doing well. Minimal lochia. Pain controlled.  patient has no ha/bv/ruq pain  O: Vital Signs Last 24 Hrs  T(C): 36.6 (07 Mar 2024 10:20), Max: 37.4 (07 Mar 2024 05:48)  T(F): 97.9 (07 Mar 2024 10:20), Max: 99.3 (07 Mar 2024 05:48)  HR: 77 (07 Mar 2024 13:40) (73 - 125)  BP: 148/94 (07 Mar 2024 13:40) (103/67 - 188/104)  BP(mean): --  RR: 18 (07 Mar 2024 13:40) (16 - 20)  SpO2: 96% (07 Mar 2024 13:40) (85% - 100%)    Parameters below as of 07 Mar 2024 13:40  Patient On (Oxygen Delivery Method): room air        Gen: NAD  Abd: soft, NT, ND, fundus firm below umbilicus  Lochia: moderate  Ext: no tenderness    Labs:                        13.3   12.38 )-----------( 121      ( 07 Mar 2024 03:13 )             37.1       A: 38y PPD#o s/p  doing well, now with preeclampsia    Plan:  MgS04 for seizure prophylaxis   procardia increased to 60 mg xl  labetalol ivp x 2  patient overall feeling well  to observe
S: Patient feels well. Pain is well controlled. She is tolerating a regular diet and passing flatus. She is voiding spontaneously, and ambulating without difficulty. Denies CP/SOB. Denies lightheadedness/dizziness. Denies N/V. Denies HA, vision changes, or RUQ pain.     O:  Vitals:  Vital Signs Last 24 Hrs  T(C): 36.8 (10 Mar 2024 00:31), Max: 37.1 (09 Mar 2024 06:00)  T(F): 98.3 (10 Mar 2024 00:31), Max: 98.7 (09 Mar 2024 06:00)  HR: 80 (10 Mar 2024 00:31) (80 - 90)  BP: 119/78 (10 Mar 2024 00:31) (115/75 - 134/84)  BP(mean): --  RR: 18 (10 Mar 2024 00:31) (18 - 18)  SpO2: 95% (10 Mar 2024 00:31) (95% - 98%)    Parameters below as of 10 Mar 2024 00:31  Patient On (Oxygen Delivery Method): room air        MEDICATIONS  (STANDING):  acetaminophen     Tablet .. 975 milliGRAM(s) Oral <User Schedule>  diphtheria/tetanus/pertussis (acellular) Vaccine (Adacel) 0.5 milliLiter(s) IntraMuscular once  ibuprofen  Tablet. 600 milliGRAM(s) Oral every 6 hours  labetalol 200 milliGRAM(s) Oral three times a day  magnesium sulfate Infusion 2 Gm/Hr (50 mL/Hr) IV Continuous <Continuous>  NIFEdipine XL 60 milliGRAM(s) Oral daily  oxytocin Infusion 41.667 milliUNIT(s)/Min (125 mL/Hr) IV Continuous <Continuous>  oxytocin Infusion 333.333 milliUNIT(s)/Min (1000 mL/Hr) IV Continuous <Continuous>  prenatal multivitamin 1 Tablet(s) Oral daily    MEDICATIONS  (PRN):  benzocaine 20%/menthol 0.5% Spray 1 Spray(s) Topical every 6 hours PRN for Perineal discomfort  dibucaine 1% Ointment 1 Application(s) Topical every 6 hours PRN Perineal discomfort  diphenhydrAMINE 25 milliGRAM(s) Oral every 6 hours PRN Pruritus  hydrocortisone 1% Cream 1 Application(s) Topical every 6 hours PRN Moderate Pain (4-6)  lanolin Ointment 1 Application(s) Topical every 6 hours PRN nipple soreness  magnesium hydroxide Suspension 30 milliLiter(s) Oral two times a day PRN Constipation  oxyCODONE    IR 5 milliGRAM(s) Oral every 3 hours PRN Moderate to Severe Pain (4-10)  oxyCODONE    IR 5 milliGRAM(s) Oral once PRN Moderate to Severe Pain (4-10)  pramoxine 1%/zinc 5% Cream 1 Application(s) Topical every 4 hours PRN Moderate Pain (4-6)  simethicone 80 milliGRAM(s) Chew every 4 hours PRN Gas  witch hazel Pads 1 Application(s) Topical every 4 hours PRN Perineal discomfort      LABS:  Blood type: A Negative  Rubella IgG: RPR: Negative                          10.7<L>   10.18 >-----------< 105<L>    ( 03-08 @ 06:49 )             31.5<L>    03-08-24 @ 06:49      137  |  104  |  6<L>  ----------------------------<  80  3.8   |  22  |  0.53        Ca    6.6<L>      08 Mar 2024 06:49  Mg     5.5<H>     03-07  Mg     <5.7<H>     03-07  Mg     4.9<H>     03-07    TPro  6.2  /  Alb  3.2<L>  /  TBili  0.2  /  DBili  x   /  AST  32  /  ALT  24  /  AlkPhos  98  03-08-24 @ 06:49          Physical exam:  Gen: NAD  Abdomen: Soft, nontender, no distension , firm uterine fundus at umbilicus.  Incision: Clean, dry, and intact   VE: No heavy vaginal bleeding  Ext: No calf tenderness

## 2024-03-12 PROBLEM — F41.9 ANXIETY DISORDER, UNSPECIFIED: Chronic | Status: ACTIVE | Noted: 2024-03-07

## 2024-03-19 PROBLEM — Z00.00 ENCOUNTER FOR PREVENTIVE HEALTH EXAMINATION: Status: ACTIVE | Noted: 2024-03-19

## 2024-04-03 ENCOUNTER — APPOINTMENT (OUTPATIENT)
Dept: CARDIOLOGY | Facility: CLINIC | Age: 39
End: 2024-04-03
Payer: COMMERCIAL

## 2024-04-03 DIAGNOSIS — Z83.438 FAMILY HISTORY OF OTHER DISORDER OF LIPOPROTEIN METABOLISM AND OTHER LIPIDEMIA: ICD-10-CM

## 2024-04-03 DIAGNOSIS — Z82.49 FAMILY HISTORY OF ISCHEMIC HEART DISEASE AND OTHER DISEASES OF THE CIRCULATORY SYSTEM: ICD-10-CM

## 2024-04-03 PROCEDURE — 99204 OFFICE O/P NEW MOD 45 MIN: CPT

## 2024-04-03 NOTE — REASON FOR VISIT
[Home] : at home, [unfilled] , at the time of the visit. [Medical Office: ([facility])___] : at the medical office located in  [Patient] : the patient [FreeTextEntry2] : Ally Lawrence

## 2024-04-03 NOTE — HISTORY OF PRESENT ILLNESS
Patient scheduled to see Siobhan as a NPT on 1/26/23.   [FreeTextEntry1] : Ms. Lawrence is a 38-year-old female that [facility] for [facility] management of her blood pressure.  She has recently delivered a baby girl on 2024,  and presented on the  with severe blood pressures measurements- meeting criteria for preeclampsia. Patient was treated with IV Magnesium for seizure prophylaxis and started on Labetalol 200 mg QD/ Procardia ER 60 mg QD.  She carries a family history of paternal hypertension, hyperlipidemia, CAD, S/P coronary stents, and early onset heart disease: MI at age 55.   Patient reports that she has stopped taking all of her BP medications yesterday.  BP today reported to be:  117/ 80  Otherwise, feels well and denies any issues of shortness of breath, chest discomfort or palpitations.

## 2024-04-03 NOTE — ASSESSMENT
[FreeTextEntry1] : Ms. Lawrence is a 38-year-old female that presents to [facility] for postpartum management of her blood pressure.  She has recently delivered a baby girl on 2024,  and presented on the  with severe blood pressures measurements- meeting criteria for preeclampsia. Patient was treated with IV Magnesium for seizure prophylaxis and started on Labetalol 200 mg QD/ Procardia ER 60 mg QD.  She carries a family history of paternal hypertension, hyperlipidemia, CAD, S/P coronary stents, and early onset heart disease: MI at age 55.   #Postpartum hypertension   Blood pressure reported today to be in good control   Requested BP log for one week to assess whether patient will need to restart BP meds   Currently has discontinued Labetalol 200mg and Procardia ER 60 mg QD     #Adverse Cardiovascular Risk Factors  Personal history of postpartum hypertension, preeclampsia Family history of paternal hypertension, hyperlipidemia, CAD, S/P coronary stents, and early onset heart disease: MI at age 55.   Recommending a baseline cardiovascular evaluation 3-a4 months postpartum to assess risk In-office physical examination and 12 lead EKG Echocardiogram to assess cardiac structure and function Exercise stress testing to assess functional status Full blood work panel:  CBC,CMP, Hgb A1C, TSH, Lipid profile  - Encouraged patient to participate in  healthy exercise (when cleared by OB) and eating habits, focusing on a Mediterranean style of eating and aiming for the recommended 150 minutes per week of moderate physical activity.  - Encouraged the patient to find healthy outlets and coping mechanisms to help manage stress, such as physical activity/exercise, reducing workload if possible, spending time with family and friends, engaging in an enjoyable hobby, or using meditation or mindfulness techniques.  I spent 45 minutes evaluating patient's history, family medical and blood pressure management, ordering tests and providing documentation.

## 2024-06-16 ENCOUNTER — NON-APPOINTMENT (OUTPATIENT)
Age: 39
End: 2024-06-16

## 2024-07-15 ENCOUNTER — APPOINTMENT (OUTPATIENT)
Dept: CARDIOLOGY | Facility: CLINIC | Age: 39
End: 2024-07-15
Payer: COMMERCIAL

## 2024-07-15 ENCOUNTER — APPOINTMENT (OUTPATIENT)
Dept: CV DIAGNOSITCS | Facility: HOSPITAL | Age: 39
End: 2024-07-15

## 2024-07-15 VITALS
OXYGEN SATURATION: 99 % | HEIGHT: 69 IN | WEIGHT: 140 LBS | SYSTOLIC BLOOD PRESSURE: 113 MMHG | HEART RATE: 69 BPM | DIASTOLIC BLOOD PRESSURE: 72 MMHG | BODY MASS INDEX: 20.73 KG/M2

## 2024-07-15 DIAGNOSIS — R06.09 OTHER FORMS OF DYSPNEA: ICD-10-CM

## 2024-07-15 PROCEDURE — 99204 OFFICE O/P NEW MOD 45 MIN: CPT | Mod: 25

## 2024-07-15 PROCEDURE — 93000 ELECTROCARDIOGRAM COMPLETE: CPT

## 2024-07-29 ENCOUNTER — TRANSCRIPTION ENCOUNTER (OUTPATIENT)
Age: 39
End: 2024-07-29

## 2024-10-18 ENCOUNTER — APPOINTMENT (OUTPATIENT)
Dept: HUMAN REPRODUCTION | Facility: CLINIC | Age: 39
End: 2024-10-18
Payer: COMMERCIAL

## 2024-10-18 PROCEDURE — 99459 PELVIC EXAMINATION: CPT | Mod: NC

## 2024-10-18 PROCEDURE — 36415 COLL VENOUS BLD VENIPUNCTURE: CPT

## 2024-10-18 PROCEDURE — 99205 OFFICE O/P NEW HI 60 MIN: CPT | Mod: 25

## 2024-10-18 PROCEDURE — 76830 TRANSVAGINAL US NON-OB: CPT

## 2024-10-31 ENCOUNTER — APPOINTMENT (OUTPATIENT)
Dept: HUMAN REPRODUCTION | Facility: CLINIC | Age: 39
End: 2024-10-31

## 2024-11-01 ENCOUNTER — RESULT REVIEW (OUTPATIENT)
Age: 39
End: 2024-11-01

## 2024-11-01 ENCOUNTER — OUTPATIENT (OUTPATIENT)
Dept: OUTPATIENT SERVICES | Facility: HOSPITAL | Age: 39
LOS: 1 days | End: 2024-11-01
Payer: COMMERCIAL

## 2024-11-01 ENCOUNTER — APPOINTMENT (OUTPATIENT)
Dept: CV DIAGNOSTICS | Facility: HOSPITAL | Age: 39
End: 2024-11-01

## 2024-11-01 ENCOUNTER — APPOINTMENT (OUTPATIENT)
Dept: CV DIAGNOSITCS | Facility: HOSPITAL | Age: 39
End: 2024-11-01

## 2024-11-01 DIAGNOSIS — Z98.890 OTHER SPECIFIED POSTPROCEDURAL STATES: Chronic | ICD-10-CM

## 2024-11-01 DIAGNOSIS — R06.09 OTHER FORMS OF DYSPNEA: ICD-10-CM

## 2024-11-01 PROCEDURE — 76376 3D RENDER W/INTRP POSTPROCES: CPT | Mod: 26

## 2024-11-01 PROCEDURE — 93356 MYOCRD STRAIN IMG SPCKL TRCK: CPT

## 2024-11-01 PROCEDURE — 93016 CV STRESS TEST SUPVJ ONLY: CPT

## 2024-11-01 PROCEDURE — 93306 TTE W/DOPPLER COMPLETE: CPT | Mod: 26

## 2024-11-01 PROCEDURE — 76376 3D RENDER W/INTRP POSTPROCES: CPT

## 2024-11-01 PROCEDURE — 93306 TTE W/DOPPLER COMPLETE: CPT

## 2024-11-01 PROCEDURE — 93018 CV STRESS TEST I&R ONLY: CPT

## 2024-11-01 PROCEDURE — 93017 CV STRESS TEST TRACING ONLY: CPT

## 2024-11-12 RX ORDER — ESCITALOPRAM OXALATE 20 MG/1
20 TABLET, FILM COATED ORAL DAILY
Refills: 0 | Status: ACTIVE | COMMUNITY
Start: 2024-11-12

## 2024-12-15 ENCOUNTER — NON-APPOINTMENT (OUTPATIENT)
Age: 39
End: 2024-12-15

## 2024-12-17 ENCOUNTER — APPOINTMENT (OUTPATIENT)
Dept: HUMAN REPRODUCTION | Facility: CLINIC | Age: 39
End: 2024-12-17

## 2024-12-18 ENCOUNTER — APPOINTMENT (OUTPATIENT)
Dept: HUMAN REPRODUCTION | Facility: CLINIC | Age: 39
End: 2024-12-18
Payer: COMMERCIAL

## 2024-12-18 PROCEDURE — 99215 OFFICE O/P EST HI 40 MIN: CPT

## 2024-12-19 ENCOUNTER — APPOINTMENT (OUTPATIENT)
Dept: HUMAN REPRODUCTION | Facility: CLINIC | Age: 39
End: 2024-12-19

## 2025-01-14 ENCOUNTER — APPOINTMENT (OUTPATIENT)
Facility: CLINIC | Age: 40
End: 2025-01-14
Payer: COMMERCIAL

## 2025-01-14 VITALS
BODY MASS INDEX: 20.22 KG/M2 | DIASTOLIC BLOOD PRESSURE: 70 MMHG | TEMPERATURE: 97.8 F | SYSTOLIC BLOOD PRESSURE: 112 MMHG | WEIGHT: 135 LBS | HEIGHT: 68.5 IN | OXYGEN SATURATION: 98 % | HEART RATE: 80 BPM

## 2025-01-14 DIAGNOSIS — F41.9 ANXIETY DISORDER, UNSPECIFIED: ICD-10-CM

## 2025-01-14 DIAGNOSIS — R06.09 OTHER FORMS OF DYSPNEA: ICD-10-CM

## 2025-01-14 DIAGNOSIS — J32.9 CHRONIC SINUSITIS, UNSPECIFIED: ICD-10-CM

## 2025-01-14 DIAGNOSIS — Z00.00 ENCOUNTER FOR GENERAL ADULT MEDICAL EXAMINATION W/OUT ABNORMAL FINDINGS: ICD-10-CM

## 2025-01-14 DIAGNOSIS — E55.9 VITAMIN D DEFICIENCY, UNSPECIFIED: ICD-10-CM

## 2025-01-14 PROCEDURE — 99214 OFFICE O/P EST MOD 30 MIN: CPT | Mod: 25

## 2025-01-14 PROCEDURE — 36415 COLL VENOUS BLD VENIPUNCTURE: CPT

## 2025-01-14 PROCEDURE — 99385 PREV VISIT NEW AGE 18-39: CPT

## 2025-01-14 RX ORDER — PREDNISONE 20 MG/1
20 TABLET ORAL DAILY
Qty: 10 | Refills: 0 | Status: ACTIVE | COMMUNITY
Start: 2025-01-14 | End: 1900-01-01

## 2025-01-15 LAB
25(OH)D3 SERPL-MCNC: 44.7 NG/ML
ALBUMIN SERPL ELPH-MCNC: 4.8 G/DL
ALP BLD-CCNC: 63 U/L
ALT SERPL-CCNC: 8 U/L
ANION GAP SERPL CALC-SCNC: 10 MMOL/L
AST SERPL-CCNC: 13 U/L
BILIRUB SERPL-MCNC: 0.7 MG/DL
BUN SERPL-MCNC: 15 MG/DL
CALCIUM SERPL-MCNC: 9.4 MG/DL
CHLORIDE SERPL-SCNC: 104 MMOL/L
CHOLEST SERPL-MCNC: 129 MG/DL
CO2 SERPL-SCNC: 26 MMOL/L
CREAT SERPL-MCNC: 0.72 MG/DL
EGFR: 110 ML/MIN/1.73M2
ESTIMATED AVERAGE GLUCOSE: 100 MG/DL
GLUCOSE SERPL-MCNC: 82 MG/DL
HBA1C MFR BLD HPLC: 5.1 %
HCT VFR BLD CALC: 39.4 %
HDLC SERPL-MCNC: 58 MG/DL
HGB BLD-MCNC: 13.1 G/DL
LDLC SERPL CALC-MCNC: 61 MG/DL
MCHC RBC-ENTMCNC: 31.2 PG
MCHC RBC-ENTMCNC: 33.2 G/DL
MCV RBC AUTO: 93.8 FL
NONHDLC SERPL-MCNC: 72 MG/DL
PLATELET # BLD AUTO: 217 K/UL
POTASSIUM SERPL-SCNC: 4.5 MMOL/L
PROT SERPL-MCNC: 7.5 G/DL
RBC # BLD: 4.2 M/UL
RBC # FLD: 11.9 %
SODIUM SERPL-SCNC: 139 MMOL/L
TRIGL SERPL-MCNC: 43 MG/DL
TSH SERPL-ACNC: 0.49 UIU/ML
VIT B12 SERPL-MCNC: 512 PG/ML
WBC # FLD AUTO: 5.72 K/UL

## 2025-01-29 ENCOUNTER — APPOINTMENT (OUTPATIENT)
Dept: HUMAN REPRODUCTION | Facility: CLINIC | Age: 40
End: 2025-01-29
Payer: COMMERCIAL

## 2025-01-29 PROCEDURE — 99459 PELVIC EXAMINATION: CPT

## 2025-01-29 PROCEDURE — 99213 OFFICE O/P EST LOW 20 MIN: CPT

## 2025-02-12 ENCOUNTER — APPOINTMENT (OUTPATIENT)
Dept: OTOLARYNGOLOGY | Facility: CLINIC | Age: 40
End: 2025-02-12
Payer: COMMERCIAL

## 2025-02-12 VITALS — WEIGHT: 138 LBS | HEIGHT: 69 IN | BODY MASS INDEX: 20.44 KG/M2

## 2025-02-12 DIAGNOSIS — J32.9 CHRONIC SINUSITIS, UNSPECIFIED: ICD-10-CM

## 2025-02-12 PROCEDURE — 99204 OFFICE O/P NEW MOD 45 MIN: CPT | Mod: 25

## 2025-02-12 PROCEDURE — 99203 OFFICE O/P NEW LOW 30 MIN: CPT | Mod: 25

## 2025-02-12 PROCEDURE — 31231 NASAL ENDOSCOPY DX: CPT

## 2025-02-12 RX ORDER — MOMETASONE FUROATE 100 %
POWDER (GRAM) MISCELLANEOUS
Qty: 1 | Refills: 3 | Status: ACTIVE | COMMUNITY
Start: 2025-02-12 | End: 1900-01-01

## 2025-05-27 ENCOUNTER — APPOINTMENT (OUTPATIENT)
Dept: OTOLARYNGOLOGY | Facility: CLINIC | Age: 40
End: 2025-05-27
Payer: COMMERCIAL

## 2025-05-27 DIAGNOSIS — J04.0 ACUTE LARYNGITIS: ICD-10-CM

## 2025-05-27 DIAGNOSIS — J32.9 CHRONIC SINUSITIS, UNSPECIFIED: ICD-10-CM

## 2025-05-27 PROCEDURE — 31231 NASAL ENDOSCOPY DX: CPT

## 2025-05-27 PROCEDURE — 99214 OFFICE O/P EST MOD 30 MIN: CPT | Mod: 25

## 2025-06-30 ENCOUNTER — APPOINTMENT (OUTPATIENT)
Dept: INTERNAL MEDICINE | Facility: CLINIC | Age: 40
End: 2025-06-30
Payer: COMMERCIAL

## 2025-06-30 VITALS
OXYGEN SATURATION: 98 % | SYSTOLIC BLOOD PRESSURE: 110 MMHG | DIASTOLIC BLOOD PRESSURE: 70 MMHG | WEIGHT: 144 LBS | RESPIRATION RATE: 16 BRPM | HEART RATE: 71 BPM | BODY MASS INDEX: 21.82 KG/M2 | HEIGHT: 68 IN | TEMPERATURE: 98.7 F

## 2025-06-30 LAB
APPEARANCE: CLEAR
BACTERIA: ABNORMAL /HPF
BILIRUBIN URINE: NEGATIVE
BLOOD URINE: NEGATIVE
CAST: 0 /LPF
COLOR: YELLOW
EPITHELIAL CELLS: 4 /HPF
GLUCOSE QUALITATIVE U: NEGATIVE MG/DL
INFLUENZA A RESULT: NOT DETECTED
INFLUENZA B RESULT: NOT DETECTED
KETONES URINE: NEGATIVE MG/DL
LEUKOCYTE ESTERASE URINE: ABNORMAL
MICROSCOPIC-UA: NORMAL
NITRITE URINE: NEGATIVE
PH URINE: 7.5
PROTEIN URINE: NEGATIVE MG/DL
RED BLOOD CELLS URINE: 1 /HPF
RESP SYN VIRUS RESULT: NOT DETECTED
SARS-COV-2 RESULT: NOT DETECTED
SPECIFIC GRAVITY URINE: 1.01
UROBILINOGEN URINE: 0.2 MG/DL
WHITE BLOOD CELLS URINE: 1 /HPF

## 2025-06-30 PROCEDURE — 99204 OFFICE O/P NEW MOD 45 MIN: CPT

## 2025-06-30 RX ORDER — AMOXICILLIN AND CLAVULANATE POTASSIUM 875; 125 MG/1; MG/1
875-125 TABLET, COATED ORAL
Qty: 14 | Refills: 0 | Status: ACTIVE | COMMUNITY
Start: 2025-06-30 | End: 1900-01-01

## 2025-06-30 RX ORDER — FLUCONAZOLE 150 MG/1
150 TABLET ORAL
Qty: 1 | Refills: 1 | Status: ACTIVE | COMMUNITY
Start: 2025-06-30 | End: 1900-01-01

## 2025-07-02 LAB — BACTERIA UR CULT: NORMAL

## 2025-07-07 ENCOUNTER — APPOINTMENT (OUTPATIENT)
Dept: HUMAN REPRODUCTION | Facility: CLINIC | Age: 40
End: 2025-07-07
Payer: COMMERCIAL

## 2025-07-07 PROCEDURE — 36415 COLL VENOUS BLD VENIPUNCTURE: CPT

## 2025-07-11 ENCOUNTER — APPOINTMENT (OUTPATIENT)
Dept: HUMAN REPRODUCTION | Facility: CLINIC | Age: 40
End: 2025-07-11